# Patient Record
Sex: MALE | Employment: FULL TIME | URBAN - METROPOLITAN AREA
[De-identification: names, ages, dates, MRNs, and addresses within clinical notes are randomized per-mention and may not be internally consistent; named-entity substitution may affect disease eponyms.]

---

## 2023-07-06 DIAGNOSIS — Z02.1 PHYSICAL EXAM, PRE-EMPLOYMENT: Primary | ICD-10-CM

## 2023-07-12 ENCOUNTER — HOSPITAL ENCOUNTER (OUTPATIENT)
Dept: GENERAL RADIOLOGY | Facility: HOSPITAL | Age: 23
Discharge: HOME | End: 2023-07-12
Attending: PREVENTIVE MEDICINE

## 2023-07-12 ENCOUNTER — HOSPITAL ENCOUNTER (OUTPATIENT)
Dept: CV DIAGNOSTICS | Facility: HOSPITAL | Age: 23
Discharge: HOME | End: 2023-07-12
Attending: PREVENTIVE MEDICINE

## 2023-07-12 ENCOUNTER — OFFICE VISIT (OUTPATIENT)
Dept: OTHER | Facility: HOSPITAL | Age: 23
End: 2023-07-12
Attending: PREVENTIVE MEDICINE

## 2023-07-12 DIAGNOSIS — Z00.00 ANNUAL PHYSICAL EXAM: ICD-10-CM

## 2023-07-12 DIAGNOSIS — Z02.1 PHYSICAL EXAM, PRE-EMPLOYMENT: ICD-10-CM

## 2023-07-12 DIAGNOSIS — Z02.89 VISIT FOR OCCUPATIONAL HEALTH EXAMINATION: Primary | ICD-10-CM

## 2023-07-12 DIAGNOSIS — Z02.89 VISIT FOR OCCUPATIONAL HEALTH EXAMINATION: ICD-10-CM

## 2023-07-12 LAB
ALBUMIN SERPL-MCNC: 4.3 G/DL (ref 3.4–5)
ALP LIVER SERPL-CCNC: 58 U/L
ALT SERPL-CCNC: 48 U/L
AST SERPL-CCNC: 20 U/L (ref 15–37)
BASOPHILS # BLD AUTO: 0.06 X10(3) UL (ref 0–0.2)
BASOPHILS NFR BLD AUTO: 0.7 %
BILIRUB SERPL-MCNC: 0.6 MG/DL (ref 0.1–2)
BILIRUB UR QL STRIP.AUTO: NEGATIVE
BUN BLD-MCNC: 9 MG/DL (ref 7–18)
CALCIUM BLD-MCNC: 9.5 MG/DL (ref 8.5–10.1)
CHLORIDE SERPL-SCNC: 108 MMOL/L (ref 98–112)
CHOLEST SERPL-MCNC: 124 MG/DL (ref ?–200)
CLARITY UR REFRACT.AUTO: CLEAR
CO2 SERPL-SCNC: 31 MMOL/L (ref 21–32)
CREAT BLD-MCNC: 0.78 MG/DL
EOSINOPHIL # BLD AUTO: 0.3 X10(3) UL (ref 0–0.7)
EOSINOPHIL NFR BLD AUTO: 3.6 %
ERYTHROCYTE [DISTWIDTH] IN BLOOD BY AUTOMATED COUNT: 13.8 %
GFR SERPLBLD BASED ON 1.73 SQ M-ARVRAT: 128 ML/MIN/1.73M2 (ref 60–?)
GGT SERPL-CCNC: 19 U/L
GLUCOSE BLD-MCNC: 87 MG/DL (ref 70–99)
GLUCOSE UR STRIP.AUTO-MCNC: NEGATIVE MG/DL
HBV SURFACE AB SER QL: NONREACTIVE
HBV SURFACE AB SERPL IA-ACNC: <3.1 MIU/ML
HCT VFR BLD AUTO: 45.8 %
HDLC SERPL-MCNC: 40 MG/DL (ref 40–59)
HGB BLD-MCNC: 15.7 G/DL
IMM GRANULOCYTES # BLD AUTO: 0.03 X10(3) UL (ref 0–1)
IMM GRANULOCYTES NFR BLD: 0.4 %
IRON SERPL-MCNC: 84 UG/DL
KETONES UR STRIP.AUTO-MCNC: NEGATIVE MG/DL
LDH SERPL L TO P-CCNC: 181 U/L
LDLC SERPL CALC-MCNC: 67 MG/DL (ref ?–100)
LEUKOCYTE ESTERASE UR QL STRIP.AUTO: NEGATIVE
LYMPHOCYTES # BLD AUTO: 2.24 X10(3) UL (ref 1–4)
LYMPHOCYTES NFR BLD AUTO: 27.2 %
MAGNESIUM SERPL-MCNC: 2.3 MG/DL (ref 1.6–2.6)
MCH RBC QN AUTO: 29.2 PG (ref 26–34)
MCHC RBC AUTO-ENTMCNC: 34.3 G/DL (ref 31–37)
MCV RBC AUTO: 85.1 FL
MONOCYTES # BLD AUTO: 0.76 X10(3) UL (ref 0.1–1)
MONOCYTES NFR BLD AUTO: 9.2 %
NEUTROPHILS # BLD AUTO: 4.84 X10 (3) UL (ref 1.5–7.7)
NEUTROPHILS # BLD AUTO: 4.84 X10(3) UL (ref 1.5–7.7)
NEUTROPHILS NFR BLD AUTO: 58.9 %
NITRITE UR QL STRIP.AUTO: NEGATIVE
NONHDLC SERPL-MCNC: 84 MG/DL (ref ?–130)
PH UR STRIP.AUTO: 7 [PH] (ref 5–8)
PHOSPHATE SERPL-MCNC: 3.1 MG/DL (ref 2.5–4.9)
PLATELET # BLD AUTO: 191 10(3)UL (ref 150–450)
POTASSIUM SERPL-SCNC: 4 MMOL/L (ref 3.5–5.1)
PROT SERPL-MCNC: 7.6 G/DL (ref 6.4–8.2)
PROT UR STRIP.AUTO-MCNC: NEGATIVE MG/DL
RBC # BLD AUTO: 5.38 X10(6)UL
RBC UR QL AUTO: NEGATIVE
SODIUM SERPL-SCNC: 140 MMOL/L (ref 136–145)
SP GR UR STRIP.AUTO: 1.01 (ref 1–1.03)
TRIGL SERPL-MCNC: 87 MG/DL (ref 30–149)
URATE SERPL-MCNC: 5.2 MG/DL
UROBILINOGEN UR STRIP.AUTO-MCNC: <2 MG/DL
VLDLC SERPL CALC-MCNC: 13 MG/DL (ref 0–30)
WBC # BLD AUTO: 8.2 X10(3) UL (ref 4–11)

## 2023-07-12 PROCEDURE — 85025 COMPLETE CBC W/AUTO DIFF WBC: CPT

## 2023-07-12 PROCEDURE — 86480 TB TEST CELL IMMUN MEASURE: CPT

## 2023-07-12 PROCEDURE — 81003 URINALYSIS AUTO W/O SCOPE: CPT

## 2023-07-12 PROCEDURE — 80053 COMPREHEN METABOLIC PANEL: CPT

## 2023-07-12 PROCEDURE — 86706 HEP B SURFACE ANTIBODY: CPT

## 2023-07-12 PROCEDURE — 80061 LIPID PANEL: CPT

## 2023-07-14 LAB
M TB IFN-G CD4+ T-CELLS BLD-ACNC: 0.02 IU/ML
M TB TUBERC IFN-G BLD QL: NEGATIVE
M TB TUBERC IGNF/MITOGEN IGNF CONTROL: >10 IU/ML
QFT TB1 AG MINUS NIL: 0.02 IU/ML
QFT TB2 AG MINUS NIL: 0.03 IU/ML

## 2023-09-12 ENCOUNTER — APPOINTMENT (OUTPATIENT)
Dept: OTHER | Facility: HOSPITAL | Age: 23
End: 2023-09-12
Attending: FAMILY MEDICINE

## 2025-06-18 ENCOUNTER — OFFICE VISIT (OUTPATIENT)
Dept: FAMILY MEDICINE CLINIC | Facility: CLINIC | Age: 25
End: 2025-06-18
Payer: COMMERCIAL

## 2025-06-18 ENCOUNTER — TELEPHONE (OUTPATIENT)
Dept: FAMILY MEDICINE CLINIC | Facility: CLINIC | Age: 25
End: 2025-06-18

## 2025-06-18 VITALS
HEART RATE: 88 BPM | DIASTOLIC BLOOD PRESSURE: 72 MMHG | WEIGHT: 211.19 LBS | HEIGHT: 69 IN | BODY MASS INDEX: 31.28 KG/M2 | OXYGEN SATURATION: 97 % | SYSTOLIC BLOOD PRESSURE: 116 MMHG

## 2025-06-18 DIAGNOSIS — Z00.00 LABORATORY EXAM ORDERED AS PART OF ROUTINE GENERAL MEDICAL EXAMINATION: ICD-10-CM

## 2025-06-18 DIAGNOSIS — F41.1 GENERALIZED ANXIETY DISORDER: ICD-10-CM

## 2025-06-18 DIAGNOSIS — Z00.00 ENCOUNTER FOR ROUTINE HISTORY AND PHYSICAL EXAMINATION: Primary | ICD-10-CM

## 2025-06-18 DIAGNOSIS — J45.20 MILD INTERMITTENT ASTHMA, UNSPECIFIED WHETHER COMPLICATED (HCC): ICD-10-CM

## 2025-06-18 DIAGNOSIS — Z00.00 ENCOUNTER FOR ROUTINE HISTORY AND PHYSICAL EXAMINATION: ICD-10-CM

## 2025-06-18 PROBLEM — L30.1: Status: ACTIVE | Noted: 2025-06-18

## 2025-06-18 PROBLEM — F90.2 ATTENTION DEFICIT HYPERACTIVITY DISORDER (ADHD), COMBINED TYPE: Status: ACTIVE | Noted: 2018-09-14

## 2025-06-18 PROBLEM — J45.909 ASTHMA (HCC): Status: ACTIVE | Noted: 2018-09-14

## 2025-06-18 PROBLEM — J30.9 ALLERGIC RHINITIS: Status: ACTIVE | Noted: 2018-09-14

## 2025-06-18 RX ORDER — BUPROPION HYDROCHLORIDE 150 MG/1
150 TABLET ORAL DAILY
COMMUNITY
Start: 2021-04-11 | End: 2025-06-18

## 2025-06-18 RX ORDER — ALBUTEROL SULFATE 90 UG/1
2 INHALANT RESPIRATORY (INHALATION) EVERY 6 HOURS PRN
COMMUNITY
Start: 2021-08-10

## 2025-06-18 RX ORDER — DUPILUMAB 300 MG/2ML
INJECTION, SOLUTION SUBCUTANEOUS
COMMUNITY
Start: 2025-06-16

## 2025-06-18 RX ORDER — AZELASTINE 1 MG/ML
1-2 SPRAY, METERED NASAL
COMMUNITY
Start: 2022-06-28 | End: 2025-06-18

## 2025-06-18 RX ORDER — KETOCONAZOLE 20 MG/ML
SHAMPOO, SUSPENSION TOPICAL
COMMUNITY
Start: 2025-04-03

## 2025-06-18 RX ORDER — TIOTROPIUM BROMIDE INHALATION SPRAY 1.56 UG/1
2 SPRAY, METERED RESPIRATORY (INHALATION) DAILY
COMMUNITY
Start: 2022-08-03

## 2025-06-18 RX ORDER — FLUTICASONE FUROATE AND VILANTEROL TRIFENATATE 200; 25 UG/1; UG/1
1 POWDER RESPIRATORY (INHALATION) DAILY
COMMUNITY

## 2025-06-18 RX ORDER — ESCITALOPRAM OXALATE 5 MG/1
TABLET, FILM COATED ORAL
COMMUNITY
End: 2025-06-18

## 2025-06-18 RX ORDER — MINOXIDIL 2.5 MG/1
TABLET ORAL
COMMUNITY
Start: 2025-05-29

## 2025-06-18 RX ORDER — CLOBETASOL PROPIONATE 0.5 MG/G
OINTMENT TOPICAL
COMMUNITY
Start: 2025-03-31

## 2025-06-18 RX ORDER — ESCITALOPRAM OXALATE 5 MG/1
5 TABLET, FILM COATED ORAL DAILY
Qty: 90 TABLET | Refills: 3 | Status: SHIPPED | OUTPATIENT
Start: 2025-06-18

## 2025-06-18 RX ORDER — CETIRIZINE HYDROCHLORIDE 10 MG/1
10 TABLET ORAL DAILY
COMMUNITY

## 2025-06-18 RX ORDER — TACROLIMUS 1 MG/G
OINTMENT TOPICAL
COMMUNITY
Start: 2025-04-23 | End: 2025-06-18

## 2025-06-18 RX ORDER — ESCITALOPRAM OXALATE 5 MG/1
5 TABLET, FILM COATED ORAL DAILY
Qty: 90 TABLET | Refills: 3 | Status: CANCELLED | OUTPATIENT
Start: 2025-06-18

## 2025-06-18 RX ORDER — BUPROPION HYDROCHLORIDE 150 MG/1
150 TABLET ORAL DAILY
Qty: 90 TABLET | Refills: 3 | Status: SHIPPED | OUTPATIENT
Start: 2025-06-18

## 2025-06-18 NOTE — PROGRESS NOTES
[facility] [place]     CC: yearly exam     HPI: Bala Staley is 24 year old male here for a yearly physical.      History of Present Illness  Bala Staley is a 24 year old male who presents for established care and medication refill.    He requires refills for Wellbutrin 150 mg and Lexapro 5 mg, which he is currently out of. These medications manage his anxiety and ADHD. He maintains regular physical activity, running three to four times a week and strength training three times a week. 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 He consumes eight to ten cups of caffeine on workdays and three cups on days off. Family history includes hypertension, cholesterol issues, and asthma.      Problem list today  1. Healthcare maintenance.  Exercise - running 3-4x/week and does strength training.  Sunscreen -.  Caffeine - 8-10 cups of coffee.  Advanced directives-  2. ADHD, generalized anxiety.  3. Mild intermittent asthma. Follows with pulmonology.  4. Eczema, vesicular.     PMH:  Problem List[1]  Past Medical History[2]       Last Physical exam 06/18/25      SH: reviewed     FH: reviewed     Social History     Social History Narrative    Works as , lives with fiance, has cats as pets.        ROS: full 10 point review of systems done and otherwise negative.  Denies any headaches, vision changes, congestion, sore throat, tinnitus, dizziness, cough, SOB, CP, palpitations, difficulty swallowing, n/v, c/d, blood in stools, urinary symptoms, LE edema, numbness/tingling in toes, focal weakness, joint pains, cold/heat intolerance or skin changes.     Healthcare Maintenance:  Health Maintenance   Topic Date Due    Annual Physical  Never done    COVID-19 Vaccine (7 - 2024-25 season) 09/01/2024    Influenza Vaccine (Season Ended) 10/01/2025    Asthma Control Test  06/18/2026    DTaP,Tdap,and Td  Vaccines (4 - Td or Tdap) 05/31/2031    Annual Depression Screening  Completed    Pneumococcal Vaccine: Birth to 50yrs  Completed    HPV Vaccines  Completed    Meningococcal B Vaccine  Aged Out          PE:  Vital Signs    06/18/25 0953   BP: 116/72   Pulse: 88     Wt Readings from Last 3 Encounters:   06/18/25 211 lb 3.2 oz (95.8 kg)     Body mass index is 31.19 kg/m².     General: Comfortable, pleasant, NAD, appears stated age  HEENT.  NC/AT, no conjunctival injection, TMs clear, oropharynx without erythema or exudate, neck supple, no LAD or thyromegaly  CV.  RRR, no m/r/g  Pulm. CTAB, no W/R/R, comfortable work of breathing, speaks in full sentences  Abdomen. Soft, nt, nd  .  defer  Extremities.  2+ DP pulses, no edema  Skin.  No concerning moles      Physical Exam  CHEST: Lungs clear to auscultation bilaterally.       No visits with results within 4 Week(s) from this visit.   Latest known visit with results is:   Office Visit on 07/12/2023   Component Date Value Ref Range Status    Quantiferon TB NIL 07/12/2023 0.02  IU/mL Final    Quantiferon TB1 minus NIL 07/12/2023 0.02  IU/mL Final    Quantiferon TB2 minus NIL 07/12/2023 0.03  IU/mL Final    Quantiferon TB Mitogen minus NIL 07/12/2023 >10.00  IU/mL Final    Quantiferon TB Result 07/12/2023 Negative  Negative Final    Glucose 07/12/2023 87  70 - 99 mg/dL Final    BUN 07/12/2023 9  7 - 18 mg/dL Final    Creatinine 07/12/2023 0.78  0.70 - 1.30 mg/dL Final    eGFR-Cr 07/12/2023 128  >=60 mL/min/1.73m2 Final    Calcium, Total 07/12/2023 9.5  8.5 - 10.1 mg/dL Final    Alkaline Phosphatase 07/12/2023 58  45 - 117 U/L Final    AST 07/12/2023 20  15 - 37 U/L Final    ALT 07/12/2023 48  16 - 61 U/L Final    Bilirubin, Total 07/12/2023 0.6  0.1 - 2.0 mg/dL Final    Total Protein 07/12/2023 7.6  6.4 - 8.2 g/dL Final    Albumin 07/12/2023 4.3  3.4 - 5.0 g/dL Final    Magnesium 07/12/2023 2.3  1.6 - 2.6 mg/dL Final    Sodium 07/12/2023 140  136 - 145 mmol/L Final     Potassium 07/12/2023 4.0  3.5 - 5.1 mmol/L Final    Chloride 07/12/2023 108  98 - 112 mmol/L Final    CO2 07/12/2023 31.0  21.0 - 32.0 mmol/L Final    Phosphorus 07/12/2023 3.1  2.5 - 4.9 mg/dL Final    Gamma Glutamyl Transferase 07/12/2023 19  15 - 85 U/L Final    LDH 07/12/2023 181  87 - 241 U/L Final    Uric Acid 07/12/2023 5.2  3.5 - 7.2 mg/dL Final    Iron 07/12/2023 84  65 - 175 ug/dL Final    Cholesterol, Total 07/12/2023 124  <200 mg/dL Final    Triglycerides 07/12/2023 87  30 - 149 mg/dL Final    HDL Cholesterol 07/12/2023 40  40 - 59 mg/dL Final    LDL Cholesterol 07/12/2023 67  <100 mg/dL Final    VLDL 07/12/2023 13  0 - 30 mg/dL Final    Non HDL Chol 07/12/2023 84  <130 mg/dL Final    Hepatitis B Surface Antibody 07/12/2023 Nonreactive  Nonreactive  Final    Heptatitis B Surface Ab Quant 07/12/2023 <3.10  mIU/mL Final    Urine Color 07/12/2023 Straw  Yellow Final    Clarity Urine 07/12/2023 Clear  Clear Final    Spec Gravity 07/12/2023 1.006  1.001 - 1.030 Final    Glucose Urine 07/12/2023 Negative  Negative mg/dL Final    Bilirubin Urine 07/12/2023 Negative  Negative Final    Ketones Urine 07/12/2023 Negative  Negative mg/dL Final    Blood Urine 07/12/2023 Negative  Negative Final    pH Urine 07/12/2023 7.0  5.0 - 8.0 Final    Protein Urine 07/12/2023 Negative  Negative mg/dL Final    Urobilinogen Urine 07/12/2023 <2.0  <2.0 mg/dL Final    Nitrite Urine 07/12/2023 Negative  Negative Final    Leukocyte Esterase Urine 07/12/2023 Negative  Negative Final    Microscopic 07/12/2023 Microscopic not indicated   Final    WBC 07/12/2023 8.2  4.0 - 11.0 x10(3) uL Final    RBC 07/12/2023 5.38  4.30 - 5.70 x10(6)uL Final    HGB 07/12/2023 15.7  13.0 - 17.5 g/dL Final    HCT 07/12/2023 45.8  39.0 - 53.0 % Final    PLT 07/12/2023 191.0  150.0 - 450.0 10(3)uL Final    MCV 07/12/2023 85.1  80.0 - 100.0 fL Final    MCH 07/12/2023 29.2  26.0 - 34.0 pg Final    MCHC 07/12/2023 34.3  31.0 - 37.0 g/dL Final    RDW  07/12/2023 13.8  % Final    Neutrophil Absolute Prelim 07/12/2023 4.84  1.50 - 7.70 x10 (3) uL Final    Neutrophil Absolute 07/12/2023 4.84  1.50 - 7.70 x10(3) uL Final    Lymphocyte Absolute 07/12/2023 2.24  1.00 - 4.00 x10(3) uL Final    Monocyte Absolute 07/12/2023 0.76  0.10 - 1.00 x10(3) uL Final    Eosinophil Absolute 07/12/2023 0.30  0.00 - 0.70 x10(3) uL Final    Basophil Absolute 07/12/2023 0.06  0.00 - 0.20 x10(3) uL Final    Immature Granulocyte Absolute 07/12/2023 0.03  0.00 - 1.00 x10(3) uL Final    Neutrophil % 07/12/2023 58.9  % Final    Lymphocyte % 07/12/2023 27.2  % Final    Monocyte % 07/12/2023 9.2  % Final    Eosinophil % 07/12/2023 3.6  % Final    Basophil % 07/12/2023 0.7  % Final    Immature Granulocyte % 07/12/2023 0.4  % Final        A/P: Bala Staley is 24 year old yo male     1. Healthcare Maintenance. Discussed eye exam, dentist visit q6 months, sunscreen, exercise at least 5x/week, 30 minutes daily, and limiting caffeine intake.  Assessment & Plan  Asthma  Well-controlled with Breo Ellipta and as-needed albuterol. Occasional flares due to occupational exposure. Managed by pulm.  - Continue Breo Ellipta  - Continue as-needed albuterol.  - Administer pneumonia vaccine.    Anxiety  Chronic anxiety managed with Lexapro 5 mg.  - Refill Lexapro 5 mg.    Attention Deficit Hyperactivity Disorder (ADHD)  Chronic ADHD managed with Wellbutrin 150 mg.  - Refill Wellbutrin 150 mg.    General Health Maintenance  Discussed high caffeine intake and potential cardiac impact. Up to date with vaccinations except pneumonia vaccine. Family history of hypertension, hyperlipidemia, and non-Hodgkin's lymphoma.  - Advise reducing caffeine intake to 3 cups per day.  - Order blood work including cholesterol screening.  - Administer pneumonia vaccine.  - Provide list of [facility] labs for blood work.    Encounter Medications[3]         [1]   Patient Active Problem List  Diagnosis    Acute on chronic  vesicular eczema of foot    Allergic rhinitis    Asthma (HCC)    Attention deficit hyperactivity disorder (ADHD), combined type    Generalized anxiety disorder    Mild intermittent asthma without complication (HCC)   [2] History reviewed. No pertinent past medical history.  [3]   Outpatient Encounter Medications as of 6/18/2025   Medication Sig Dispense Refill    albuterol 108 (90 Base) MCG/ACT Inhalation Aero Soln Inhale 2 puffs into the lungs every 6 (six) hours as needed.      buPROPion  MG Oral Tablet 24 Hr Take 1 tablet (150 mg total) by mouth daily.      cetirizine 10 MG Oral Tab Take 1 tablet (10 mg total) by mouth daily.      clobetasol 0.05 % External Ointment APPLY TOPICALLY TO BOTTOM OF BOTH FEET EVERY EVENING FOR 1 WEEK THEN STOP      DUPIXENT 300 MG/2ML Subcutaneous Solution Prefilled Syringe       LEXAPRO 5 MG Oral Tab       BREO ELLIPTA 200-25 MCG/ACT Inhalation Aerosol Powder, Breath Activated Inhale 1 puff into the lungs daily.      ketoconazole 2 % External Shampoo LATHER INTO SCALP 2-3 TIMES WEEKLY AND LET SIT FOR 5 MINUTES BEFORE RINSING      minoxidil 2.5 MG Oral Tab       azelastine 0.1 % Nasal Solution 1-2 sprays by Nasal route. (Patient not taking: Reported on 6/18/2025)      tacrolimus 0.1 % External Ointment APPLY TOPICALLY TO THE AFFECTED AREA 1 TO 2 TIMES DAILY UNTIL GONE (Patient not taking: Reported on 6/18/2025)      Tiotropium Bromide Monohydrate (SPIRIVA RESPIMAT) 1.25 MCG/ACT Inhalation Aero Soln Inhale 2 puffs into the lungs daily. (Patient not taking: Reported on 6/18/2025)       No facility-administered encounter medications on file as of 6/18/2025.

## 2025-06-18 NOTE — PROGRESS NOTES
The following individual(s) verbally consented to be recorded using ambient AI listening technology and understand that they can each withdraw their consent to this listening technology at any point by asking the clinician to turn off or pause the recording:    Patient name: Bala Staley

## 2025-06-23 RX ORDER — ESCITALOPRAM OXALATE 5 MG/1
5 TABLET ORAL DAILY
Qty: 90 TABLET | Refills: 3 | Status: SHIPPED | OUTPATIENT
Start: 2025-06-23

## 2025-06-23 NOTE — TELEPHONE ENCOUNTER
RaftOut message not read.    Per dispense report:      Called patient and confirmed he has always been on generic, just referred to the medication as brand name when reporting his medication list at the office visit.   Prior auth has not been required for generic in the past.   Last Rx has ABBI: Yes   New Rx pended, please review and advise.

## 2025-06-23 NOTE — TELEPHONE ENCOUNTER
Called pharmacy and confirmed generic is covered for $5 copay and will be ready of pickup today.

## 2025-08-19 ENCOUNTER — LAB ENCOUNTER (OUTPATIENT)
Dept: LAB | Age: 25
End: 2025-08-19
Attending: STUDENT IN AN ORGANIZED HEALTH CARE EDUCATION/TRAINING PROGRAM

## 2025-08-19 ENCOUNTER — LAB ENCOUNTER (OUTPATIENT)
Dept: LAB | Age: 25
End: 2025-08-19
Attending: INTERNAL MEDICINE

## 2025-08-19 DIAGNOSIS — Z00.00 LABORATORY EXAM ORDERED AS PART OF ROUTINE GENERAL MEDICAL EXAMINATION: ICD-10-CM

## 2025-08-19 DIAGNOSIS — L64.8 OTHER ANDROGENIC ALOPECIA: Primary | ICD-10-CM

## 2025-08-19 LAB
ALBUMIN SERPL-MCNC: 5 G/DL (ref 3.2–4.8)
ALBUMIN/GLOB SERPL: 2.1 (ref 1–2)
ALP LIVER SERPL-CCNC: 59 U/L (ref 45–117)
ALT SERPL-CCNC: 31 U/L (ref 10–49)
ANION GAP SERPL CALC-SCNC: 6 MMOL/L (ref 0–18)
AST SERPL-CCNC: 17 U/L (ref ?–34)
BASOPHILS # BLD AUTO: 0.05 X10(3) UL (ref 0–0.2)
BASOPHILS NFR BLD AUTO: 0.8 %
BILIRUB SERPL-MCNC: 0.6 MG/DL (ref 0.3–1.2)
BUN BLD-MCNC: 11 MG/DL (ref 9–23)
CALCIUM BLD-MCNC: 10.2 MG/DL (ref 8.7–10.6)
CHLORIDE SERPL-SCNC: 104 MMOL/L (ref 98–112)
CHOLEST SERPL-MCNC: 147 MG/DL (ref ?–200)
CO2 SERPL-SCNC: 32 MMOL/L (ref 21–32)
CREAT BLD-MCNC: 0.89 MG/DL (ref 0.7–1.3)
DEPRECATED HBV CORE AB SER IA-ACNC: 49 NG/ML (ref 50–336)
EGFRCR SERPLBLD CKD-EPI 2021: 121 ML/MIN/1.73M2 (ref 60–?)
EOSINOPHIL # BLD AUTO: 0.12 X10(3) UL (ref 0–0.7)
EOSINOPHIL NFR BLD AUTO: 1.8 %
ERYTHROCYTE [DISTWIDTH] IN BLOOD BY AUTOMATED COUNT: 13.7 %
FASTING PATIENT LIPID ANSWER: YES
FASTING STATUS PATIENT QL REPORTED: YES
GLOBULIN PLAS-MCNC: 2.4 G/DL (ref 2–3.5)
GLUCOSE BLD-MCNC: 85 MG/DL (ref 70–99)
HCT VFR BLD AUTO: 46.8 % (ref 39–53)
HDLC SERPL-MCNC: 40 MG/DL (ref 40–59)
HGB BLD-MCNC: 15.8 G/DL (ref 13–17.5)
IMM GRANULOCYTES # BLD AUTO: 0.02 X10(3) UL (ref 0–1)
IMM GRANULOCYTES NFR BLD: 0.3 %
IRON SATN MFR SERPL: 28 % (ref 20–50)
IRON SERPL-MCNC: 92 UG/DL (ref 65–175)
LDLC SERPL CALC-MCNC: 93 MG/DL (ref ?–100)
LYMPHOCYTES # BLD AUTO: 1.98 X10(3) UL (ref 1–4)
LYMPHOCYTES NFR BLD AUTO: 30.1 %
MCH RBC QN AUTO: 28.7 PG (ref 26–34)
MCHC RBC AUTO-ENTMCNC: 33.8 G/DL (ref 31–37)
MCV RBC AUTO: 84.9 FL (ref 80–100)
MONOCYTES # BLD AUTO: 0.65 X10(3) UL (ref 0.1–1)
MONOCYTES NFR BLD AUTO: 9.9 %
NEUTROPHILS # BLD AUTO: 3.75 X10 (3) UL (ref 1.5–7.7)
NEUTROPHILS # BLD AUTO: 3.75 X10(3) UL (ref 1.5–7.7)
NEUTROPHILS NFR BLD AUTO: 57.1 %
NONHDLC SERPL-MCNC: 107 MG/DL (ref ?–130)
OSMOLALITY SERPL CALC.SUM OF ELEC: 293 MOSM/KG (ref 275–295)
PLATELET # BLD AUTO: 216 10(3)UL (ref 150–450)
POTASSIUM SERPL-SCNC: 4.5 MMOL/L (ref 3.5–5.1)
PROT SERPL-MCNC: 7.4 G/DL (ref 5.7–8.2)
RBC # BLD AUTO: 5.51 X10(6)UL (ref 4.3–5.7)
SODIUM SERPL-SCNC: 142 MMOL/L (ref 136–145)
T4 FREE SERPL-MCNC: 1.3 NG/DL (ref 0.8–1.7)
TOTAL IRON BINDING CAPACITY: 330 UG/DL (ref 250–425)
TRANSFERRIN SERPL-MCNC: 263 MG/DL (ref 215–365)
TRIGL SERPL-MCNC: 71 MG/DL (ref 30–149)
TSI SER-ACNC: 1.12 UIU/ML (ref 0.55–4.78)
VIT D+METAB SERPL-MCNC: 29.5 NG/ML (ref 30–100)
VLDLC SERPL CALC-MCNC: 12 MG/DL (ref 0–30)
WBC # BLD AUTO: 6.6 X10(3) UL (ref 4–11)

## 2025-08-19 PROCEDURE — 85025 COMPLETE CBC W/AUTO DIFF WBC: CPT

## 2025-08-19 PROCEDURE — 83540 ASSAY OF IRON: CPT

## 2025-08-19 PROCEDURE — 84439 ASSAY OF FREE THYROXINE: CPT

## 2025-08-19 PROCEDURE — 82728 ASSAY OF FERRITIN: CPT

## 2025-08-19 PROCEDURE — 84443 ASSAY THYROID STIM HORMONE: CPT

## 2025-08-19 PROCEDURE — 80061 LIPID PANEL: CPT

## 2025-08-19 PROCEDURE — 83550 IRON BINDING TEST: CPT

## 2025-08-19 PROCEDURE — 36415 COLL VENOUS BLD VENIPUNCTURE: CPT

## 2025-08-19 PROCEDURE — 82306 VITAMIN D 25 HYDROXY: CPT

## 2025-08-19 PROCEDURE — 80053 COMPREHEN METABOLIC PANEL: CPT
